# Patient Record
Sex: FEMALE | Race: OTHER | NOT HISPANIC OR LATINO | URBAN - METROPOLITAN AREA
[De-identification: names, ages, dates, MRNs, and addresses within clinical notes are randomized per-mention and may not be internally consistent; named-entity substitution may affect disease eponyms.]

---

## 2019-01-29 VITALS
DIASTOLIC BLOOD PRESSURE: 63 MMHG | RESPIRATION RATE: 16 BRPM | HEIGHT: 61 IN | HEART RATE: 72 BPM | SYSTOLIC BLOOD PRESSURE: 110 MMHG | WEIGHT: 164.24 LBS | OXYGEN SATURATION: 100 % | TEMPERATURE: 97 F

## 2019-01-30 ENCOUNTER — OUTPATIENT (OUTPATIENT)
Dept: OUTPATIENT SERVICES | Facility: HOSPITAL | Age: 49
LOS: 1 days | Discharge: HOME | End: 2019-01-30
Payer: COMMERCIAL

## 2019-01-30 VITALS
HEART RATE: 72 BPM | OXYGEN SATURATION: 99 % | RESPIRATION RATE: 14 BRPM | SYSTOLIC BLOOD PRESSURE: 130 MMHG | DIASTOLIC BLOOD PRESSURE: 64 MMHG

## 2019-01-30 DIAGNOSIS — Z98.890 OTHER SPECIFIED POSTPROCEDURAL STATES: Chronic | ICD-10-CM

## 2019-01-30 RX ORDER — SODIUM CHLORIDE 9 MG/ML
1000 INJECTION, SOLUTION INTRAVENOUS
Qty: 0 | Refills: 0 | Status: DISCONTINUED | OUTPATIENT
Start: 2019-01-30 | End: 2019-01-31

## 2019-01-30 RX ORDER — ONDANSETRON 8 MG/1
4 TABLET, FILM COATED ORAL ONCE
Qty: 0 | Refills: 0 | Status: DISCONTINUED | OUTPATIENT
Start: 2019-01-30 | End: 2019-01-31

## 2019-01-30 RX ORDER — METOCLOPRAMIDE HCL 10 MG
10 TABLET ORAL EVERY 6 HOURS
Qty: 0 | Refills: 0 | Status: DISCONTINUED | OUTPATIENT
Start: 2019-01-30 | End: 2019-01-31

## 2019-01-30 NOTE — BRIEF OPERATIVE NOTE - OPERATION/FINDINGS
Dilated cervix to #18 dilator. Diagnostic hysteroscopy. Identified uterine adhesions and intramural fibroid noted, not submucosal thus no myomectomy performed. Dilation and curettage performed, EMB sent to pathology. Bilateral ostia identified.  Deficit 190

## 2019-01-30 NOTE — BRIEF OPERATIVE NOTE - PROCEDURE
<<-----Click on this checkbox to enter Procedure Hysteroscopy, with dilation and curettage of uterus  01/30/2019    Active  [email or web address]

## 2019-01-30 NOTE — BRIEF OPERATIVE NOTE - POST-OP DX
Uterine adhesion  01/30/2019    Active  Ramandeep Peace  Uterine polyp  01/30/2019    Active  Ramandeep Peace

## 2019-01-30 NOTE — BRIEF OPERATIVE NOTE - PRE-OP DX
Uterine fibroid  01/30/2019    Active  Ramandeep Peace  Uterine polyp  01/30/2019    Active  Ramandeep Peace

## 2019-01-31 LAB — SURGICAL PATHOLOGY STUDY: SIGNIFICANT CHANGE UP

## 2019-01-31 PROCEDURE — 86901 BLOOD TYPING SEROLOGIC RH(D): CPT

## 2019-01-31 PROCEDURE — 88305 TISSUE EXAM BY PATHOLOGIST: CPT

## 2019-01-31 PROCEDURE — 58558 HYSTEROSCOPY BIOPSY: CPT

## 2019-01-31 PROCEDURE — 86850 RBC ANTIBODY SCREEN: CPT

## 2019-01-31 PROCEDURE — 86900 BLOOD TYPING SEROLOGIC ABO: CPT

## 2022-09-07 NOTE — ASU PREOP CHECKLIST - LOOSE TEETH
"Intermittent emesis x 2 days, pt walks with steady gait, pt has hx of PTSD and complains of " brain fog"  VAN neg in triage .Pt reports 5/6 syncopal episodes, x 3 days pt states hit head during syncopal episode 2 days ago W/LOC, denies cp/sob,c/o HA x 2 days, + currently on menstrual cycle, reports heavier then normal bleeding.    Patient identifiers verified by spelling and stated name on armband along with .     Review of patient's allergies indicates:   Allergen Reactions    Latex, natural rubber Rash    Sulfa (sulfonamide antibiotics) Hives        APPEARANCE: Alert, oriented and in no acute distress.  CARDIAC: Normal rate and rhythm, no murmur heard.   PERIPHERAL VASCULAR: peripheral pulses present. Normal cap refill. No edema. Warm to touch.    RESPIRATORY:Normal rate and effort, breath sounds clear bilaterally throughout chest. Respirations are equal and unlabored no obvious signs of distress.  GASTRO: soft, bowel sounds normal, no tenderness, no abdominal distention.  MUSC: Full ROM. No bony tenderness or soft tissue tenderness. No obvious deformity.  SKIN: Skin is warm and dry, normal skin turgor, mucous membranes moist.  NEURO: 5/5 strength major flexors/extensors bilaterally. Sensory intact to light touch bilaterally. Niko coma scale: eyes open spontaneously-4, oriented & converses-5, obeys commands-6. No neurological abnormalities.   MENTAL STATUS: awake, alert and aware of environment.  EYE: PERRL, both eyes: pupils brisk and reactive to light. Normal size.  ENT: EARS: no obvious drainage. NOSE: no active bleeding.     Patient verbalized understanding of status and plan of care. Patient changed into hospital gown   Patient side rails are up x 2, bed is low and locked, call light is in reach Cardiac monitor (alarms on, set, and audible), pulse oximeter, and automatic blood pressure cuff applied.   Will continue to monitor.    " no